# Patient Record
Sex: MALE | Race: WHITE | URBAN - METROPOLITAN AREA
[De-identification: names, ages, dates, MRNs, and addresses within clinical notes are randomized per-mention and may not be internally consistent; named-entity substitution may affect disease eponyms.]

---

## 2021-07-16 ENCOUNTER — CLINICAL DOCUMENTATION (OUTPATIENT)
Dept: BEHAVIORAL/MENTAL HEALTH CLINIC | Age: 21
End: 2021-07-16

## 2021-07-16 ENCOUNTER — OFFICE VISIT (OUTPATIENT)
Dept: FAMILY MEDICINE CLINIC | Age: 21
End: 2021-07-16
Payer: COMMERCIAL

## 2021-07-16 VITALS
DIASTOLIC BLOOD PRESSURE: 80 MMHG | WEIGHT: 245.6 LBS | HEART RATE: 54 BPM | TEMPERATURE: 97.5 F | BODY MASS INDEX: 29.91 KG/M2 | HEIGHT: 76 IN | OXYGEN SATURATION: 97 % | SYSTOLIC BLOOD PRESSURE: 124 MMHG

## 2021-07-16 DIAGNOSIS — Z00.00 WELL ADULT EXAM: Primary | ICD-10-CM

## 2021-07-16 DIAGNOSIS — F43.9 STRESS: ICD-10-CM

## 2021-07-16 DIAGNOSIS — F90.2 ATTENTION DEFICIT HYPERACTIVITY DISORDER (ADHD), COMBINED TYPE: ICD-10-CM

## 2021-07-16 DIAGNOSIS — Z11.3 SCREENING EXAMINATION FOR STD (SEXUALLY TRANSMITTED DISEASE): ICD-10-CM

## 2021-07-16 DIAGNOSIS — Z13.228 SCREENING FOR METABOLIC DISORDER: ICD-10-CM

## 2021-07-16 DIAGNOSIS — Z71.3 DIETARY COUNSELING: ICD-10-CM

## 2021-07-16 DIAGNOSIS — Z71.82 EXERCISE COUNSELING: ICD-10-CM

## 2021-07-16 PROCEDURE — 99385 PREV VISIT NEW AGE 18-39: CPT | Performed by: FAMILY MEDICINE

## 2021-07-16 PROCEDURE — G8417 CALC BMI ABV UP PARAM F/U: HCPCS | Performed by: FAMILY MEDICINE

## 2021-07-16 PROCEDURE — 80305 DRUG TEST PRSMV DIR OPT OBS: CPT | Performed by: FAMILY MEDICINE

## 2021-07-16 RX ORDER — DEXTROAMPHETAMINE SACCHARATE, AMPHETAMINE ASPARTATE MONOHYDRATE, DEXTROAMPHETAMINE SULFATE AND AMPHETAMINE SULFATE 2.5; 2.5; 2.5; 2.5 MG/1; MG/1; MG/1; MG/1
10 CAPSULE, EXTENDED RELEASE ORAL DAILY
Qty: 30 CAPSULE | Refills: 0 | Status: SHIPPED | OUTPATIENT
Start: 2021-07-16 | End: 2021-10-26 | Stop reason: SDUPTHER

## 2021-07-16 RX ORDER — DEXTROAMPHETAMINE SACCHARATE, AMPHETAMINE ASPARTATE, DEXTROAMPHETAMINE SULFATE AND AMPHETAMINE SULFATE 1.25; 1.25; 1.25; 1.25 MG/1; MG/1; MG/1; MG/1
5 TABLET ORAL DAILY
Qty: 10 TABLET | Refills: 0 | Status: SHIPPED | OUTPATIENT
Start: 2021-07-16 | End: 2021-10-26 | Stop reason: SDUPTHER

## 2021-07-16 SDOH — ECONOMIC STABILITY: FOOD INSECURITY: WITHIN THE PAST 12 MONTHS, YOU WORRIED THAT YOUR FOOD WOULD RUN OUT BEFORE YOU GOT MONEY TO BUY MORE.: NEVER TRUE

## 2021-07-16 SDOH — ECONOMIC STABILITY: FOOD INSECURITY: WITHIN THE PAST 12 MONTHS, THE FOOD YOU BOUGHT JUST DIDN'T LAST AND YOU DIDN'T HAVE MONEY TO GET MORE.: NEVER TRUE

## 2021-07-16 ASSESSMENT — PATIENT HEALTH QUESTIONNAIRE - PHQ9
1. LITTLE INTEREST OR PLEASURE IN DOING THINGS: 0
SUM OF ALL RESPONSES TO PHQ QUESTIONS 1-9: 0
SUM OF ALL RESPONSES TO PHQ9 QUESTIONS 1 & 2: 0
2. FEELING DOWN, DEPRESSED OR HOPELESS: 0
SUM OF ALL RESPONSES TO PHQ QUESTIONS 1-9: 0
SUM OF ALL RESPONSES TO PHQ QUESTIONS 1-9: 0

## 2021-07-16 ASSESSMENT — SOCIAL DETERMINANTS OF HEALTH (SDOH): HOW HARD IS IT FOR YOU TO PAY FOR THE VERY BASICS LIKE FOOD, HOUSING, MEDICAL CARE, AND HEATING?: NOT HARD AT ALL

## 2021-07-16 NOTE — PATIENT INSTRUCTIONS
Learning About Obesity  What is obesity? Obesity means having an unhealthy amount of body fat. This puts your health in danger. It can lead to other health problems, such as type 2 diabetes and high blood pressure. How do you know if your weight is in the obesity range? To know if your weight is in the obesity range, your doctor looks at your body mass index (BMI) and waist size. BMI is a number that is calculated from your weight and your height. To figure out your BMI for yourself, you can use an online tool, such as http://www.melendrez.com/ on the [employer] of [employer]. If your BMI is 30.0 or higher, it falls within the obesity range. Keep in mind that BMI and waist size are only guides. They are not tools to determine your ideal body weight. What causes obesity? When you take in more calories than you burn off, you gain weight. How you eat, how active you are, and other things affect how your body uses calories and whether you gain weight. If you have family members who have too much body fat, you may have inherited a tendency to gain weight. And your family also helps form your eating and lifestyle habits, which can lead to obesity. Also, our busy lives make it harder to plan and cook healthy meals. For many of us, it's easier to reach for prepared foods, go out to eat, or go to the drive-through. But these foods are often high in saturated fat and calories. Portions are often too large. What can you do to reach a healthy weight? Focus on health, not diets. Diets are hard to stay on and don't work in the long run. It is very hard to stay with a diet that includes lots of big changes in your eating habits. Instead of a diet, focus on lifestyle changes that will improve your health and achieve the right balance of energy and calories. To lose weight, you need to burn more calories than you take in.  You can do it by eating healthy foods in reasonable amounts and becoming more active, even a little bit every day. Making small changes over time can add up to a lot. Make a plan for change. Many people have found that naming their reasons for change and staying focused on their plan can make a big difference. Work with your doctor to create a plan that is right for you. · Ask yourself: Shyanne Barriga are my personal, most powerful reasons for wanting this change? What will my life look like when I've made the change? \"  · Set your long-term goal. Make it specific, such as \"I will lose x pounds. \"  · Break your long-term goal into smaller, short-term goals. Make these small steps specific and within your reach, things you know you can do. These steps are what keep you going from day to day. Talk with your doctor about other weight-loss options. If you have a BMI in a certain range and have not been able to lose weight with diet and exercise, medicine or surgery may be an option for you. Before your doctor will prescribe medicines or surgery, he or she will probably want you to be more active and follow your healthy eating plan for a period of time. These habits are key lifelong changes for managing your weight, with or without other medical treatment. And these changes can help you avoid weight-related health problems. How can you stay on your plan for change? Be ready. Choose to start during a time when there are few events like holidays, social events, and high-stress periods. These events might trigger slip-ups. Decide on your first few steps. Most people have more success when they make small changes, one step at a time. For example, you might switch a daily candy bar to a piece of fruit, walk 10 minutes more, or add more vegetables to a meal.  Line up your support people. Make sure you're not going to be alone as you make this change. Connect with people who understand how important it is to you.  Ask family members and friends for help in keeping It's also a good idea to know your test results and keep a list of the medicines you take. Where can you learn more? Go to https://SendGridpepiceweb.[employer]. org and sign in to your P[employer] account. Enter N111 in the [facility] box to learn more about \"Learning About Obesity. \"     If you do not have an account, please click on the \"Sign Up Now\" link. Current as of: March 17, 2021               Content Version: 12.9  © 0018-4751 Healthwise, Incorporated. Care instructions adapted under license by [facility] ([facility]). If you have questions about a medical condition or this instruction, always ask your healthcare professional. [address] any warranty or liability for your use of this information.

## 2021-07-16 NOTE — PROGRESS NOTES
Progress Note    Radha Carrera is a 24 y.o.  male who presents today alone for evaluation of   Chief Complaint   Patient presents with    [facility] Care    ADHD           HPI:   Patient is here to [facility] care today. Patient PMH ADHD. Patient PSH 5th metatarsal fracture repair. Patient denies fhx. Patient denies tobacco products. Patient denies regular EtOH consumption. Patient denies illicits. Patient denies SIG E CAPS. Patient denies SI/HI. Patient denies anxiety. Patient adheres to a specific diet. 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 Patient would like STD screening with his blood work. PHQ-9 Total Score: 0 (7/16/2021 11:44 AM)    Patient is here to discuss ADHD. Patient was diagnosed by psychology at age 11. Patient did bring his testing and documentation today. Patient has not been on stimulant therapy in the past. Patient states he has had difficulty completing school tasks and other tasks both at home and at school in the past. Patient states he has noticed inattentiveness and hyperactivity. Health Maintenance Due   Topic Date Due    Hepatitis C screen  Never done    HPV vaccine (1 - Male 2-dose series) Never done    COVID-19 Vaccine (1) Never done    HIV screen  Never done    Hepatitis A vaccine (2 of 2 - 2-dose series) 11/10/2018        Current Medications:     Current Outpatient Medications   Medication Sig Dispense Refill    amphetamine-dextroamphetamine (ADDERALL XR) 10 MG extended release capsule Take 1 capsule by mouth daily for 30 days. 30 capsule 0    amphetamine-dextroamphetamine (ADDERALL, 5MG,) 5 MG tablet Take 1 tablet by mouth daily for 10 days. To be used if extra hours of study are required 10 tablet 0     No current facility-administered medications for this visit.        Allergies:   No Known Allergies     Medical History:     Past Medical History:   Diagnosis Date    Attention deficit hyperactivity disorder (ADHD), combined type Past Surgical History:   Procedure Laterality Date    FOOT SURGERY      5th metatarsal repair right       History reviewed. No pertinent family history. Social History:     Social History     Socioeconomic History    Marital status: Single     Spouse name: Not on file    Number of children: Not on file    Years of education: Not on file    Highest education level: Not on file   Occupational History    Not on file   Tobacco Use    Smoking status: Never Smoker    Smokeless tobacco: Never Used   Substance and Sexual Activity    Alcohol use: Never    Drug use: Not Currently    Sexual activity: Not on file   Other Topics Concern    Not on file   Social History Narrative    Not on file     Social Determinants of Health     Financial Resource Strain: Low Risk     Difficulty of Paying Living Expenses: Not hard at all   Food Insecurity: No Food Insecurity    Worried About Running Out of Food in the Last Year: Never true    [address] in the Last Year: Never true   Transportation Needs:     Lack of Transportation (Medical):  Lack of Transportation (Non-Medical):    Physical Activity:     Days of Exercise per Week:     Minutes of Exercise per Session:    Stress:     Feeling of Stress :    Social Connections:     Frequency of Communication with Friends and Family:     Frequency of Social Gatherings with Friends and Family:     Attends Faith Services:     Active Member of Clubs or Organizations:     Attends Club or Organization Meetings:     Marital Status:    Intimate Partner Violence:     Fear of Current or Ex-Partner:     Emotionally Abused:     Physically Abused:     Sexually Abused:         ROS:     Constitutional: No fevers, chills, fatigue. +inattentiveness & hyperactivity  ENT: No nasal congestion or sore throat  Respiratory: No difficulty in breathing or cough.    Cardiovascular: No chest pain, palpitations or shortness of breath  Gastrointestinal: No abdominal pain or Acute; Future  -     HIV Screen; Future  -     RPR Reflex; Future  -     Trichomonas Vaginali, Molecular; Future    Attention deficit hyperactivity disorder (ADHD), combined type  -     amphetamine-dextroamphetamine (ADDERALL XR) 10 MG extended release capsule; Take 1 capsule by mouth daily for 30 days. -     amphetamine-dextroamphetamine (ADDERALL, 5MG,) 5 MG tablet; Take 1 tablet by mouth daily for 10 days. To be used if extra hours of study are required  -     POCT Rapid Drug Screen    Follow up on labs. Encouraged well balanced diet. Encouraged 150 mins of aerobic activity weekly. OARRS reviewed. UDS ordered and reviewed. CSA signed. Will start adderall XR today. IR adderall provided in small quantity if extra study hours are needed. Forms completed ADHD testing reviewed. All questions answered and addressed to patient satisfaction. Patient understands and agrees to the plan. The patient was evaluated and treated today based on the osteopathic principle that each person is a unit of body, mind, and spirit, the body is capable of self-regulation, self-healing, and health maintenance and that structure and function are reciprocally interrelated. Follow-up:   Return in about 1 month (around 8/16/2021) for ADHD; 20 min appt. Arlin Jacobsen D.O.    BMI was elevated today, and weight loss plan recommended is : conventional weight loss.

## 2021-07-16 NOTE — PROGRESS NOTES
Warm handoff requested by Chad Blackburn DO and was completed. He was seen for about 20 minutes from 12:40-1:00PM.  He denied any current SI/HI and reported symptoms of irritability and depression in addition to ADHD. Patient scheduled for future face to face visit.   An initial behavioral health assessment with the [facility] is scheduled in 1 week on 7/23/21 at [address] [place] [address].

## 2021-07-23 ENCOUNTER — VIRTUAL VISIT (OUTPATIENT)
Dept: BEHAVIORAL/MENTAL HEALTH CLINIC | Age: 21
End: 2021-07-23
Payer: COMMERCIAL

## 2021-07-23 DIAGNOSIS — F43.23 ADJUSTMENT DISORDER WITH MIXED ANXIETY AND DEPRESSED MOOD: Primary | ICD-10-CM

## 2021-07-23 PROCEDURE — 90791 PSYCH DIAGNOSTIC EVALUATION: CPT | Performed by: PSYCHOLOGIST

## 2021-07-23 NOTE — PROGRESS NOTES
Jorge Irizarry,  Ph.D.   Licensed Clinical Psychologist ([phone])    Visit Date: 7/23/2021   Time of appointment:  9:14a - 10:14a   Time spent with Patient: 60 minutes. This is patient's first appointment. Reason for Consult:  Anxiety, Stress, Depression, and ADHD     Referring Provider/PCP:    No ref. provider found  Karol Suarez DO      Pt provided informed consent for the behavioral health program. Discussed with patient model of service to include the limits of confidentiality (i.e. abuse reporting, suicide intervention, etc.) and short-term intervention focused approach. Pt indicated understanding. PRESENTING PROBLEM AND HISTORY  Deni Akins is a 24 y.o. male who presents for new evaluation and treatment of anxiety, depression, and irritability. He has the following symptoms: depressed mood, low self-esteem, anger/irritability, feeling nervous, anxious, or on edge, racing worry thoughts, excessive anxiety and worry about specific stressors, difficulty with attention/concentration and relationship issues. Onset of symptoms was approximately a few years ago. Symptoms have been variable since that time. Deni Akins reported that throughout his adolescence, he has struggled with a tendency to suppress his emotions until he eventually \"explodes\" and \"lashes out\" at whoever is around him at the time. He stated that sometimes he avoids having to deal with intense emotions by isolating himself and sleeping too much or by drinking alcohol, smoking marijuana, and other negative behavior. He denied any symptoms of alcohol or substance abuse, but admitted that he recognizes that using substances or avoidant, negative behavior as a way of coping is not healthy.   Deni Akins reported that he feels like his mood can be a \"rollercoaster,\" with some brief periods of mild depression (lasting 1-2 days) and irritability alternating with periods of feeling more anxious and stressed and periods of more normal, \"happy\" mood. He stated that he feels he can manage his lower mood periods more effectively through his spirituality, talking with friends, and listening to music. However, he admitted that he continues to struggle with irritability, anxiety, and stress at times. He reported that most of his worries are about the future, particularly about being able to become a good man who is successful, responsible, and able to provide and protect. He stated that he does not want to repeat the mistakes his father has made, but also feels the pressure to fulfill the \"image of a man\" his father taught him to be. Ángel Brooks was also diagnosed with ADHD as a child and he worries about how the ADHD affects his present life and how it will affect his future. Ángel Brooks reported that he believes a lot of his mood problems and issues with low self-esteem are related to his childhood and experiences with his father that he has never really addressed \"professionally. \"  He reported that his father had an affair when he was very young, which led to his parents  when he was about 6yo. He stated that his father made the divorce extremely difficult for his mother as well as for him and his older brother. He stated that he remembers his father becoming verbally abusive at times towards him and his brother and would make them feel extremely guilty and would call them names suggesting that they were weak for wanting to spend anytime with their mother. He reported that his father ended up marrying the woman with whom he was having an affair and his mother ended up meeting and then marrying the ex- of this woman a few years later, which Ángel Brooks admitted created a \"weird\" dynamic in his family. Despite everything his father did, Ángel Brooks reported that his mother always remained loving and supportive towards him and his brother during this difficult time.   He stated that his step-father has also been a \"truly loving\" father figure and has served as a good role-model on how to \"do it right\" as a father. Even with the struggles with his father, Viviana James reported that he has tried to maintain a good relationship with his father. He stated that his father can be supportive and provide good advice at times, but can also be extremely critical, emotionally manipulative, and overly pushes his expectations and ideals on others. He described a few incidents as a younger teenager and even just a few weeks ago where his father publicly shamed him for relatively minor mistakes (e.g. getting a speeding ticket), by telling multiple family members and posting about it on Facebook. Viviana James reported that he recognizes that his father has personality issues that can make it difficult to have a healthy relationship with him, but he stated that it is important to him to try and maintain a good relationship. He reported that his older brother eventually moved away and now refuses any contact with their father and both him and his mother have tried to help Viviana James see how his father can be extremely manipulative and damaging to his mental well-being. Viviana James reported that he is now seeking therapy to help work on these past issues that are related to his father and to help learn to cope with his emotions and stress in healthier ways. He denies current suicidal and homicidal ideation. Family history significant for anxiety, depression and substance abuse. Risk factors: negative life event (divorce, parent relationship problems). Previous treatment includes NONE. He complains of the following medication side effects: none. MENTAL STATUS EXAM  Mood was anxious, dysthymic and irritable with calm affect. Suicidal ideation was denied. Homicidal ideation was denied. Hygiene was good . Dress was appropriate. Behavior was Within Normal Limits with No observation or self-report of difficulties ambulating. Attitude was Cooperative.   Eye-contact was Pt stated that he was assessed and diagnosed with ADHD as a young child (copy of assessment report is uploaded in chart). He reported that he was prescribed Adderall, but never took the medication because his father did not want him to. He stated that he recently discussed his history of ADHD with his PCP on 7/16/21 and that his PCP agreed to start prescribing Adderall, but they are waiting for him to start when school and football start this fall. Pt denied any history of psychiatric hospitalizations or suicide attempts. He denied any history of non-suicidal self-harm behavior. He also denied any history of suicidal ideation, particularly given his spiritual beliefs. PSYCHOSOCIAL HISTORY  Current living situation:   Pt lives in an apartment he rents in [place], [place] along with 2 male roommates, who are also close friends. They moved in together at the end of May 2021. He reported that before living in this apartment, he was living at the dormatories of his university. Pt stated that he is not currently in any romantic relationship. He reported that his last serious relationship was in high school with a girl he dated for about 3-4 years. He stated that this relationship created a lot of conflict with his father because his father believed he could \"do better\" and that she distracted him from football. 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 Support system:   Pt reported that his biggest source of emotional and physical support is his mother, whom he described as having \"always been there\" for him.   He reported that his older brother is another source of major support and is someone he feels he can talk to when he needs to talk \"man to man\" or if he needs spiritual guidance (as his brother is studying to become a ). Pt reported that his roommate and football teammate, Jennifer Parsons, is another source of support. Pt stated that his father can also be supportive and helpful when he needs something and stated that his father can offer good guidance and advice. Rastafari/Spirituality:   Pt reported that he is [place] and stated that his chintan and spirituality are an extremely important part of his life. DRUG AND ALCOHOL CURRENT USE/HISTORY  TOBACCO:  He reports that he has never smoked. He has never used smokeless tobacco.  ALCOHOL:  He reports no history of alcohol use. OTHER SUBSTANCES: He reports previous drug use. ASSESSMENT  Yani Dhaliwal presented to the appointment today for evaluation and treatment of symptoms of anxiety, depression, and irritability. He is currently deemed no risk to himself or others and meets criteria for Adjustment Disorder with Mixed Anxiety and Depressed Mood. Jeane Cha also has a history of ADHD diagnosed when he was a child (copy of report has been scanned into chart). His PCP has prescribed him Adderall for the ADHD, but he will not be starting the medication until school and football season start later this August.  He is not currently interested in any medications for mood at this time. He will benefit from brief and solution-focused consultation to address cognitive and behavioral interventions for anxiety and depression symptoms. Jeane Cha was in agreement with recommendations. PHQ Scores 7/16/2021   PHQ2 Score 0   PHQ9 Score 0     Interpretation of Total Score Depression Severity: 1-4 = Minimal depression, 5-9 = Mild depression, 10-14 = Moderate depression, 15-19 = Moderately severe depression, 20-27 = Severe depression    How often pt has had thoughts of death or hurting self (if PHQ positive for depression):       No flowsheet data found. Interpretation of SILVANO-7 score: 5-9 = mild anxiety, 10-14 = moderate anxiety, 15+ = severe anxiety.  Recommend referral to behavioral health for scores 10 or greater. DIAGNOSIS  Levi Del Rio was seen today for anxiety, stress, depression and adhd. Diagnoses and all orders for this visit:    Adjustment disorder with mixed anxiety and depressed mood          INTERVENTION  Provided education, Discussed self-care (sleep, nutrition, rewarding activities, social support, exercise), Established rapport, Supportive techniques, Emphasized self-care as important for managing overall health and Provided Psychoeducation re: setting healthy emotional boundaries      PLAN  1)  Pt will return for a follow-up visit with the [facility] in 3 weeks on 8/13/21 at [facility]  Is interactive complexity present?   No  Reason:  N/A  Additional Supporting Information:  N/A       Electronically signed by Meeta Lu, PhD on 7/23/21 at 9:12 AM EDT

## 2021-10-15 DIAGNOSIS — F90.2 ATTENTION DEFICIT HYPERACTIVITY DISORDER (ADHD), COMBINED TYPE: ICD-10-CM

## 2021-10-15 RX ORDER — DEXTROAMPHETAMINE SACCHARATE, AMPHETAMINE ASPARTATE, DEXTROAMPHETAMINE SULFATE AND AMPHETAMINE SULFATE 1.25; 1.25; 1.25; 1.25 MG/1; MG/1; MG/1; MG/1
5 TABLET ORAL DAILY
Qty: 10 TABLET | Refills: 0 | OUTPATIENT
Start: 2021-10-15 | End: 2021-10-25

## 2021-10-15 RX ORDER — DEXTROAMPHETAMINE SACCHARATE, AMPHETAMINE ASPARTATE MONOHYDRATE, DEXTROAMPHETAMINE SULFATE AND AMPHETAMINE SULFATE 2.5; 2.5; 2.5; 2.5 MG/1; MG/1; MG/1; MG/1
10 CAPSULE, EXTENDED RELEASE ORAL DAILY
Qty: 30 CAPSULE | Refills: 0 | OUTPATIENT
Start: 2021-10-15 | End: 2021-11-14

## 2021-10-15 NOTE — TELEPHONE ENCOUNTER
King Barron is calling to request a refill on the following medication(s):    Medication Request:  Requested Prescriptions     Pending Prescriptions Disp Refills    amphetamine-dextroamphetamine (ADDERALL, 5MG,) 5 MG tablet 10 tablet 0     Sig: Take 1 tablet by mouth daily for 10 days. To be used if extra hours of study are required    amphetamine-dextroamphetamine (ADDERALL XR) 10 MG extended release capsule 30 capsule 0     Sig: Take 1 capsule by mouth daily for 30 days.        Last Visit Date (If Applicable):  9/30/0896    Next Visit Date:    Visit date not found

## 2021-10-26 ENCOUNTER — TELEPHONE (OUTPATIENT)
Dept: FAMILY MEDICINE CLINIC | Age: 21
End: 2021-10-26

## 2021-10-26 RX ORDER — DEXTROAMPHETAMINE SACCHARATE, AMPHETAMINE ASPARTATE, DEXTROAMPHETAMINE SULFATE AND AMPHETAMINE SULFATE 1.25; 1.25; 1.25; 1.25 MG/1; MG/1; MG/1; MG/1
5 TABLET ORAL DAILY
Qty: 10 TABLET | Refills: 0 | Status: SHIPPED | OUTPATIENT
Start: 2021-10-26 | End: 2021-10-29

## 2021-10-26 RX ORDER — DEXTROAMPHETAMINE SACCHARATE, AMPHETAMINE ASPARTATE MONOHYDRATE, DEXTROAMPHETAMINE SULFATE AND AMPHETAMINE SULFATE 2.5; 2.5; 2.5; 2.5 MG/1; MG/1; MG/1; MG/1
10 CAPSULE, EXTENDED RELEASE ORAL DAILY
Qty: 30 CAPSULE | Refills: 0 | Status: SHIPPED | OUTPATIENT
Start: 2021-10-26 | End: 2021-10-29

## 2021-10-26 NOTE — TELEPHONE ENCOUNTER
----- Message from Mastodon C sent at 10/25/2021  5:36 PM EDT -----  Subject: Message to Provider    QUESTIONS  Information for Provider? Patient has set a VV emmie for Fri 10/29 with   Donavon Choudhury. If you need to make that earlier in the week please call   patient to resched. He absolutely needs his meds ASAP Kanika quevedoe 10/26. Please leave a detailed if needed on VM.  ---------------------------------------------------------------------------  --------------  CALL BACK INFO  What is the best way for the office to contact you? OK to leave message on   voicemail  Preferred Call Back Phone Number? [phone]  ---------------------------------------------------------------------------  --------------  SCRIPT ANSWERS  Relationship to Patient?  Self

## 2021-10-26 NOTE — TELEPHONE ENCOUNTER
Spoke to patient informed him that he needs to make sure that he keeps his virtual visit in Friday. Dr Wallace Whitfield sent in scripts this time as a favor. If he does not answer will not send in like this again.

## 2022-08-02 ENCOUNTER — TELEPHONE (OUTPATIENT)
Dept: FAMILY MEDICINE CLINIC | Age: 22
End: 2022-08-02

## 2022-08-02 NOTE — TELEPHONE ENCOUNTER
Unfortunately it has been a year since even a virtual visit and with this being a controlled substance he would need an appt with a provider who will be at this practice after I leave.  Thx.

## 2022-08-02 NOTE — TELEPHONE ENCOUNTER
Spoke to patient  with message from Dr. Tika August. Gave patient options of our office or [facility] for soonest appt. Patient wanted to be transferred to [facility] [facility] due to our 1st available is November.

## 2022-08-02 NOTE — TELEPHONE ENCOUNTER
Patient called asking for a refill of adderall - patient states he doesn't take it all of the time and has only had this prescribed by you. He states you have called him in the past and you are welcome to if you want to speak directly to him. I did let him know today is your last day but, I did tell him I would send this to you.

## 2022-12-05 ENCOUNTER — OFFICE VISIT (OUTPATIENT)
Dept: ORTHOPEDIC SURGERY | Age: 22
End: 2022-12-05

## 2022-12-05 VITALS — WEIGHT: 245 LBS | HEIGHT: 76 IN | RESPIRATION RATE: 14 BRPM | BODY MASS INDEX: 29.83 KG/M2

## 2022-12-05 DIAGNOSIS — M79.671 PAIN, FOOT, CHRONIC, RIGHT: Primary | ICD-10-CM

## 2022-12-05 DIAGNOSIS — G89.29 PAIN, FOOT, CHRONIC, RIGHT: Primary | ICD-10-CM

## 2022-12-05 DIAGNOSIS — M84.374D STRESS FRACTURE OF METATARSAL BONE OF RIGHT FOOT WITH ROUTINE HEALING, SUBSEQUENT ENCOUNTER: ICD-10-CM

## 2022-12-05 NOTE — PROGRESS NOTES
[facility]  [id number] [facility]  Dept Phone: [phone]  Dept Fax: [phone]      Orthopaedic Trauma New Patient      CHIEF COMPLAINT:    Chief Complaint   Patient presents with    Injury     Right foot pain       HISTORY OF PRESENT ILLNESS:    The patient is a 25 y.o. male who is being seen as a new patient for right foot pain. 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  Patient states he still has 4 years of athletic eligibility left. Patient states he has had intermittent right foot pain for the past couple weeks. Patient has a history of bilateral fifth metatarsal stress fractures for which he underwent fixation by Dr. Moraima Ba.  Patient states his right fifth metatarsal base fracture was fixed in 2020 and for the past 2 years he has had no issues. Patient states that he has had 2 stress fractures in his left fifth metatarsal.  The most recent surgery was September 2022 on his left foot. Patient's goal is to get back to off-season conditioning. Patient states that he was prompted to come in for evaluation as he noted some increased pain in the same area that he had pain in the past to his fifth metatarsal.  Patient states he would read about stress fractures, and stated that had he come in sooner with his other stress fractures, he possibly could have avoided surgery, so this new onset pain prompted him for evaluation. Denies any new injuries or falls. Denies any numbness or tingling. Patient states when he rests his foot, the pain subsides, but with increase in activity he has increasing pain.       Past Medical History:    Past Medical History:   Diagnosis Date    Attention deficit hyperactivity disorder (ADHD), combined type        Past Surgical History:    Past Surgical History:   Procedure Laterality Date    FOOT SURGERY      5th metatarsal repair right       Current Medications:   Current Outpatient Medications   Medication Sig Dispense Refill    amphetamine-dextroamphetamine (ADDERALL XR) 20 MG extended release capsule Take 1 capsule by mouth every morning for 30 days. 30 capsule 0    amphetamine-dextroamphetamine (ADDERALL XR) 20 MG extended release capsule Take 1 capsule by mouth every morning for 30 days. 30 capsule 0    amphetamine-dextroamphetamine (ADDERALL, 5MG,) 5 MG tablet Take 1 tablet by mouth daily for 30 days. 30 tablet 0    amphetamine-dextroamphetamine (ADDERALL, 5MG,) 5 MG tablet Take 1 tablet by mouth daily for 30 days. 30 tablet 0     No current facility-administered medications for this visit. Allergies:    Patient has no known allergies. Social History:   Social History     Socioeconomic History    Marital status: Single     Spouse name: Not on file    Number of children: Not on file    Years of education: Not on file    Highest education level: Not on file   Occupational History    Not on file   Tobacco Use    Smoking status: Never    Smokeless tobacco: Never   Substance and Sexual Activity    Alcohol use: Never    Drug use: Not Currently    Sexual activity: Not on file   Other Topics Concern    Not on file   Social History Narrative    Not on file     Social Determinants of Health     Financial Resource Strain: Not on file   Food Insecurity: Not on file   Transportation Needs: Not on file   Physical Activity: Not on file   Stress: Not on file   Social Connections: Not on file   Intimate Partner Violence: Not on file   Housing Stability: Not on file       Family History:  No family history on file.       REVIEW OF SYSTEMS:  Review of Systems    Gen: no fever, chills, malaise  CV: no chest pain or palpitations  Resp: no cough or shortness of breath  GI: no nausea, vomiting, diarrhea, or constipation  Neuro: no seizures, vertigo, or headaches  Msk: Right foot pain  10 remaining systems reviewed and negative      PHYSICAL EXAM:  Resp 14   Ht 6' 4\" (1.93 m)   Wt 245 lb (111.1 kg)   BMI 29.82 kg/m² Body mass index is 29.82 kg/m². Physical Exam  Gen: alert and oriented, no acute distress  Psych: Appropriate affect; Appropriate knowledge base; Appropriate mood; No hallucinations  Head: normocephalic atraumatic   Chest: symmetric chest excursion  Ortho Exam  MSK: Right lower extremity: Small scar present to proximal aspect of fifth metatarsal from prior surgical intervention. No gross deformity. No antalgic gait. No significant foot deformity noted. No tenderness palpation about calcaneus, ankle, midfoot, or medial column of foot. Mild tenderness palpation about fifth metatarsal both laterally and dorsally. No tenderness palpation about fourth metatarsal.  No pain with ambulation. No palpable click with stress examination in both varus and valgus stress. No erythema, or signs of infection. Radiology:   History: 28-year-old male with right foot pain    Comparison: None    Findings: 3 views of the right foot (AP/lateral/oblique) in a skeletally mature patient showing orthopedic hardware in the form of a single screw within the fifth metatarsal.  The screw does appear to be breaching the medial cortex of the fifth metatarsal.  No signs of fracture or dislocation. Joint alignment well-maintained throughout. Accessory navicular, and bipartite fibular sesamoid noted. Impression: Orthopedic hardware fifth metatarsal without any acute osseous abnormality       ASSESSMENT:  25 y.o. male with right foot pain    PLAN:  Lengthy discussion had with patient about current clinical state. Discussed and reviewed radiographic findings with patient. Discussed that there is a chance that the screw itself could be causing some discomfort. Discussed that we will order an MRI, to evaluate if any signs of occult fracture noted to lateral column of foot. Patient may continue weightbearing as tolerated to the right lower extremity.   Discussed I would recommend against any running activities until MRI performed. Discussed that if no signs of fracture, could perform hardware removal to fifth metatarsal with protected weightbearing for 6-8 weeks postoperatively. Discussed that once MRI obtained, patient will follow-up with me in my clinic and we will discuss MRI results. All questions answered. Patient is amenable to this plan. No follow-ups on file. No orders of the defined types were placed in this encounter. Orders Placed This Encounter   Procedures    XR FOOT RIGHT (MIN 3 VIEWS)     Standing Status:   Future     Number of Occurrences:   1     Standing Expiration Date:   12/5/2023     Order Specific Question:   Reason for exam:     Answer:   monitor healing    MRI FOOT RIGHT WO CONTRAST     Standing Status:   Future     Standing Expiration Date:   12/5/2023     Scheduling Instructions: With metal supression     Order Specific Question:   Reason for exam:     Answer:   dx     Order Specific Question:   What is the sedation requirement? Answer:   None     Order Specific Question:   What is the area of interest?     Answer:   Forefoot        Electronically signed by Jim Bae DO on 12/5/2022 at 5:08 PM    This note is created with the assistance of a speech recognition program.  While intending to generate a document that actually reflects the content of the visit, the document can still have some errors including those of syntax and sound a like substitutions which may escape proof reading.   In such instances, actual meaning can be extrapolated by contextual diversion